# Patient Record
Sex: MALE | Race: BLACK OR AFRICAN AMERICAN | NOT HISPANIC OR LATINO | URBAN - METROPOLITAN AREA
[De-identification: names, ages, dates, MRNs, and addresses within clinical notes are randomized per-mention and may not be internally consistent; named-entity substitution may affect disease eponyms.]

---

## 2018-08-26 ENCOUNTER — EMERGENCY (EMERGENCY)
Facility: HOSPITAL | Age: 33
LOS: 1 days | Discharge: HOME | End: 2018-08-26
Attending: EMERGENCY MEDICINE | Admitting: EMERGENCY MEDICINE
Payer: COMMERCIAL

## 2018-08-26 VITALS
SYSTOLIC BLOOD PRESSURE: 129 MMHG | DIASTOLIC BLOOD PRESSURE: 80 MMHG | HEART RATE: 100 BPM | OXYGEN SATURATION: 98 % | RESPIRATION RATE: 20 BRPM | WEIGHT: 179.9 LBS | TEMPERATURE: 98 F

## 2018-08-26 VITALS
DIASTOLIC BLOOD PRESSURE: 70 MMHG | OXYGEN SATURATION: 98 % | HEART RATE: 104 BPM | TEMPERATURE: 98 F | RESPIRATION RATE: 20 BRPM | SYSTOLIC BLOOD PRESSURE: 141 MMHG

## 2018-08-26 DIAGNOSIS — F10.129 ALCOHOL ABUSE WITH INTOXICATION, UNSPECIFIED: ICD-10-CM

## 2018-08-26 DIAGNOSIS — R41.82 ALTERED MENTAL STATUS, UNSPECIFIED: ICD-10-CM

## 2018-08-26 PROCEDURE — 99283 EMERGENCY DEPT VISIT LOW MDM: CPT | Mod: 25

## 2018-08-26 PROCEDURE — 99285 EMERGENCY DEPT VISIT HI MDM: CPT

## 2018-08-26 PROCEDURE — 82962 GLUCOSE BLOOD TEST: CPT

## 2018-08-26 NOTE — ED PROVIDER NOTE - SKIN, MLM
Skin normal color for race, warm, dry and intact. a 2 mm abrasion to L 5th PIP, FROM, no lac, no bleeding

## 2018-08-26 NOTE — ED PROVIDER NOTE - OBJECTIVE STATEMENT
The pt is a 34 y/o M, BIBA, for intox - found sleeping on the street. Pt admits to drinking "a little bit", states that was on his way home when he fell asleep. Denies any drug use, n/v/d, cp, sob, falling down

## 2018-08-26 NOTE — ED ADULT TRIAGE NOTE - ARRIVAL INFO ADDITIONAL COMMENTS
pt found sleeping on [address] easily arousable to verbal stimulus. admits to etoh use tonight pt found sleeping on [address] easily arousable to verbal stimuli. admits to etoh use tonight

## 2018-08-26 NOTE — ED PROVIDER NOTE - MEDICAL DECISION MAKING DETAILS
etoh intox, no head trauma, fs stable, hemodynamically stable, well appearing, observed until clinically sober, ambulated w/steady gait, safe for dc, no med/psych c/o

## 2018-08-26 NOTE — ED PROVIDER NOTE - ATTENDING CONTRIBUTION TO CARE
32 yo male biba for etoh intox after bein found sleeping on the street, + etoh tonight, no drugs.  No other complaints.  No trauma.  Intoxicated, well appearing, nad, nc/at, lung cta, heart reg, abd soft/nt, ext no trauma, no gross neuro deficits.  Pt here w intox; plan dc when awake, ambulatory w steady gait.

## 2018-08-26 NOTE — ED ADULT NURSE NOTE - NSIMPLEMENTINTERV_GEN_ALL_ED
Implemented All Fall Risk Interventions:  [place] to call system. Call bell, personal items and telephone within reach. Instruct patient to call for assistance. Room bathroom lighting operational. Non-slip footwear when patient is off stretcher. Physically safe environment: no spills, clutter or unnecessary equipment. Stretcher in lowest position, wheels locked, appropriate side rails in place. Provide visual cue, wrist band, yellow gown, etc. Monitor gait and stability. Monitor for mental status changes and reorient to person, place, and time. Review medications for side effects contributing to fall risk. Reinforce activity limits and safety measures with patient and family.

## 2018-08-26 NOTE — ED ADULT NURSE REASSESSMENT NOTE - NS ED NURSE REASSESS COMMENT FT1
Patient awake, ambulatory with steady gait.  Patient's wife requesting to take him home. Evaluated by PA and discharged.